# Patient Record
Sex: MALE | Race: WHITE | Employment: OTHER | URBAN - NONMETROPOLITAN AREA
[De-identification: names, ages, dates, MRNs, and addresses within clinical notes are randomized per-mention and may not be internally consistent; named-entity substitution may affect disease eponyms.]

---

## 2020-06-22 NOTE — PROGRESS NOTES
as needed for Migraine      scopolamine (TRANSDERM-SCOP) patch   Place onto the skin every 72 hours as needed       aspirin 81 MG tablet Take 81 mg by mouth daily.  esomeprazole (NEXIUM) 40 MG capsule Take 40 mg by mouth every morning (before breakfast).  Multiple Vitamin (MULTI-VITAMIN PO) Take  by mouth daily. No current facility-administered medications for this visit. No Known Allergies    Subjective:      Review of Systems   Constitutional: Negative. Negative for activity change, appetite change, chills, diaphoresis, fatigue, fever and unexpected weight change. HENT: Negative. Negative for congestion, dental problem, drooling, ear discharge, ear pain, facial swelling, hearing loss, mouth sores, nosebleeds, postnasal drip, rhinorrhea, sinus pressure, sinus pain, sneezing, sore throat, tinnitus, trouble swallowing and voice change. Eyes: Negative. Negative for photophobia, pain, discharge, redness, itching and visual disturbance. Respiratory: Negative. Negative for apnea, cough, choking, chest tightness, shortness of breath, wheezing and stridor. Cardiovascular: Negative for chest pain, palpitations and leg swelling. Gastrointestinal: Positive for abdominal pain. Endocrine: Negative. Negative for cold intolerance, heat intolerance, polydipsia, polyphagia and polyuria. Genitourinary: Positive for testicular pain. Negative for decreased urine volume, difficulty urinating, discharge, dysuria, enuresis, flank pain, frequency, genital sores, hematuria, penile pain, penile swelling, scrotal swelling and urgency. Musculoskeletal: Negative for arthralgias, back pain, gait problem, joint swelling, myalgias, neck pain and neck stiffness. Skin: Negative for color change, pallor, rash and wound. Allergic/Immunologic: Negative. Negative for environmental allergies, food allergies and immunocompromised state. Neurological: Positive for headaches.  Negative for dizziness, tremors, seizures, syncope, facial asymmetry, speech difficulty, weakness, light-headedness and numbness. Hematological: Negative. Negative for adenopathy. Does not bruise/bleed easily. Psychiatric/Behavioral: Negative. Negative for agitation, behavioral problems, confusion, decreased concentration, dysphoric mood, hallucinations, self-injury, sleep disturbance and suicidal ideas. The patient is not nervous/anxious and is not hyperactive. Objective: There were no vitals taken for this visit. Physical Exam  Constitutional:       Appearance: He is well-developed. HENT:      Head: Normocephalic and atraumatic. Eyes:      General: No scleral icterus. Left eye: No discharge. Conjunctiva/sclera: Conjunctivae normal.      Pupils: Pupils are equal, round, and reactive to light. Neck:      Thyroid: No thyromegaly. Trachea: No tracheal deviation. Cardiovascular:      Rate and Rhythm: Normal rate and regular rhythm. Heart sounds: Normal heart sounds. No murmur. No friction rub. No gallop. Pulmonary:      Effort: Pulmonary effort is normal. No respiratory distress. Breath sounds: Normal breath sounds. No wheezing or rales. Chest:      Chest wall: No tenderness. Abdominal:      Palpations: There is no mass. Comments: Inguinal hernia incisions healing well   Musculoskeletal: Normal range of motion. General: No tenderness. Lymphadenopathy:      Cervical: No cervical adenopathy. Skin:     General: Skin is warm and dry. Coloration: Skin is not pale. Findings: No erythema or rash. Neurological:      Mental Status: He is alert and oriented to person, place, and time. Psychiatric:         Behavior: Behavior normal.         Thought Content:  Thought content normal.         Judgment: Judgment normal.            Results for orders placed or performed during the hospital encounter of 10/05/11   Alpha-1-antitrypsin   Result Value Ref Range    A-1 Antitrypsin SEE BELOW        Assessment:     1. History of colonic polyps in need of screening colonoscopy discussed the sedation with the patient he has been through this before he understands he also understands the prep 2. Persistent history of reflux disease with mild dysphasia with known mild stricture we will also perform EGD with possible dilatation at that time    Plan:     1. Schedule Dino CASILLAS for colonoscopy and EGD  2. The risks, benefits and alternatives were discussed with Dino CASILLAS. He understands and wishes to proceed with surgical intervention. 3. Restrictions discussed with July Garcia and he expresses understanding. 4. He is advised to call back directly if there are further questions/concerns, or if his symptoms worsen prior to surgery.             Electronicallysigned by Sylvie Castro MD on 6/22/2020 at 7:54 PM

## 2020-06-23 ENCOUNTER — OFFICE VISIT (OUTPATIENT)
Dept: SURGERY | Age: 70
End: 2020-06-23
Payer: COMMERCIAL

## 2020-06-23 VITALS
BODY MASS INDEX: 25.12 KG/M2 | HEART RATE: 82 BPM | DIASTOLIC BLOOD PRESSURE: 60 MMHG | RESPIRATION RATE: 18 BRPM | OXYGEN SATURATION: 96 % | SYSTOLIC BLOOD PRESSURE: 120 MMHG | WEIGHT: 185.5 LBS | HEIGHT: 72 IN | TEMPERATURE: 96.5 F

## 2020-06-23 PROCEDURE — 99213 OFFICE O/P EST LOW 20 MIN: CPT | Performed by: SURGERY

## 2020-06-24 ENCOUNTER — PREP FOR PROCEDURE (OUTPATIENT)
Dept: SURGERY | Age: 70
End: 2020-06-24

## 2020-06-24 ENCOUNTER — TELEPHONE (OUTPATIENT)
Dept: SURGERY | Age: 70
End: 2020-06-24

## 2020-06-24 RX ORDER — SODIUM CHLORIDE 450 MG/100ML
INJECTION, SOLUTION INTRAVENOUS CONTINUOUS
Status: CANCELLED | OUTPATIENT
Start: 2020-06-24

## 2020-07-21 PROCEDURE — 43450 DILATE ESOPHAGUS 1/MULT PASS: CPT | Performed by: SURGERY

## 2020-07-23 ENCOUNTER — HOSPITAL ENCOUNTER (OUTPATIENT)
Age: 70
Discharge: HOME | End: 2020-07-23
Payer: MEDICARE

## 2020-07-23 PROCEDURE — U0003 INFECTIOUS AGENT DETECTION BY NUCLEIC ACID (DNA OR RNA); SEVERE ACUTE RESPIRATORY SYNDROME CORONAVIRUS 2 (SARS-COV-2) (CORONAVIRUS DISEASE [COVID-19]), AMPLIFIED PROBE TECHNIQUE, MAKING USE OF HIGH THROUGHPUT TECHNOLOGIES AS DESCRIBED BY CMS-2020-01-R: HCPCS

## 2020-07-24 LAB — SARS-COV-2: NOT DETECTED

## 2020-07-27 ASSESSMENT — ENCOUNTER SYMPTOMS
TROUBLE SWALLOWING: 0
CHOKING: 0
EYE PAIN: 0
VOICE CHANGE: 0
WHEEZING: 0
RHINORRHEA: 0
PHOTOPHOBIA: 0
EYE ITCHING: 0
EYES NEGATIVE: 1
FACIAL SWELLING: 0
RESPIRATORY NEGATIVE: 1
SORE THROAT: 0
COUGH: 0
CHEST TIGHTNESS: 0
EYE REDNESS: 0
BACK PAIN: 0
APNEA: 0
SINUS PRESSURE: 0
SHORTNESS OF BREATH: 0
ABDOMINAL PAIN: 1
STRIDOR: 0
ALLERGIC/IMMUNOLOGIC NEGATIVE: 1
EYE DISCHARGE: 0
COLOR CHANGE: 0
SINUS PAIN: 0

## 2020-07-27 NOTE — H&P
[address] [facility]  [address] [facility] [address]  Dept: [phone]  Dept Fax: [phone]  Loc: [phone]    Visit Date: 6/23/2020    Ronnie Cooper is a 78 y.o. male who presentstoday for:  Chief Complaint   Patient presents with    Surgical Consult     est pt- last seen- 2015- due for colonoscopy/ EGD- last one- 8/27/2015       HPI:     HPI 78-year-old white male I performed a colonoscopy in 2015 and at that time had a history of known polyps although no polyps were found 5 years ago he did have scattered diverticular disease also has a long history of acid reflux and we did an EGD at that time also with dilatation to [address] for a mild stricture patient is here for colonoscopy follow-up due to his history of polyps he is still having issues with reflux and we will plan an EGD also  He has no change in bowel habits he has no family history of colon cancer it should be noted the patient has had bilateral inguinal hernia repairs at another facility in January of this year  Past Medical History:   Diagnosis Date    Fracture of nose 1961      Past Surgical History:   Procedure Laterality Date   Sriram Devante  2010    Dr. Sandro Orona  2010    Dr. Navdeep Jesus        Family History   Problem Relation Age of Onset    Stroke Father     Cancer Mother         female    Stroke Mother     Diabetes Maternal Grandmother     Heart Disease Neg Hx     High Blood Pressure Neg Hx         Social History     Tobacco Use    Smoking status: Never Smoker    Smokeless tobacco: Never Used   Substance Use Topics    Alcohol use: Yes     Comment: occasional          Current Outpatient Medications   Medication Sig Dispense Refill    pravastatin (PRAVACHOL) 20 MG tablet Take 20 mg by mouth daily      SUMAtriptan (IMITREX) 100 MG tablet Take 100 mg by mouth once as needed for Migraine      scopolamine (TRANSDERM-SCOP) patch   Place onto the skin every 72 hours as needed       aspirin 81 MG tablet Take 81 mg by mouth daily.  esomeprazole (NEXIUM) 40 MG capsule Take 40 mg by mouth every morning (before breakfast).  Multiple Vitamin (MULTI-VITAMIN PO) Take  by mouth daily. No current facility-administered medications for this visit. No Known Allergies    Subjective:      Review of Systems   Constitutional: Negative. Negative for activity change, appetite change, chills, diaphoresis, fatigue, fever and unexpected weight change. HENT: Negative. Negative for congestion, dental problem, drooling, ear discharge, ear pain, facial swelling, hearing loss, mouth sores, nosebleeds, postnasal drip, rhinorrhea, sinus pressure, sinus pain, sneezing, sore throat, tinnitus, trouble swallowing and voice change. Eyes: Negative. Negative for photophobia, pain, discharge, redness, itching and visual disturbance. Respiratory: Negative. Negative for apnea, cough, choking, chest tightness, shortness of breath, wheezing and stridor. Cardiovascular: Negative for chest pain, palpitations and leg swelling. Gastrointestinal: Positive for abdominal pain. Endocrine: Negative. Negative for cold intolerance, heat intolerance, polydipsia, polyphagia and polyuria. Genitourinary: Positive for testicular pain. Negative for decreased urine volume, difficulty urinating, discharge, dysuria, enuresis, flank pain, frequency, genital sores, hematuria, penile pain, penile swelling, scrotal swelling and urgency. Musculoskeletal: Negative for arthralgias, back pain, gait problem, joint swelling, myalgias, neck pain and neck stiffness. Skin: Negative for color change, pallor, rash and wound. Allergic/Immunologic: Negative. Negative for environmental allergies, food allergies and immunocompromised state. Neurological: Positive for headaches.  Negative for dizziness,

## 2020-07-27 NOTE — H&P
[address] [address]  History and Physical Update      Pt Name: Reina Fitzpatrick  MRN: [id number]  YOB: 1950  Date of evaluation: 7/27/2020    [x] I have examined the patient and reviewed the H&P/Consult and there are no changes to the patient or plans. [] I have examined the patient and reviewed the H&P/Consult and have noted the following changes:         Janeth Bowles  Electronically signed 7/27/2020 at 7:50 PM

## 2020-07-28 ENCOUNTER — ANESTHESIA EVENT (OUTPATIENT)
Dept: ENDOSCOPY | Age: 70
End: 2020-07-28
Payer: MEDICARE

## 2020-07-28 ENCOUNTER — HOSPITAL ENCOUNTER (OUTPATIENT)
Age: 70
Setting detail: OUTPATIENT SURGERY
Discharge: HOME | End: 2020-07-28
Attending: SURGERY | Admitting: SURGERY
Payer: MEDICARE

## 2020-07-28 ENCOUNTER — ANESTHESIA (OUTPATIENT)
Dept: ENDOSCOPY | Age: 70
End: 2020-07-28
Payer: MEDICARE

## 2020-07-28 VITALS
TEMPERATURE: 96.9 F | OXYGEN SATURATION: 97 % | WEIGHT: 180.8 LBS | RESPIRATION RATE: 16 BRPM | HEART RATE: 59 BPM | DIASTOLIC BLOOD PRESSURE: 71 MMHG | BODY MASS INDEX: 24.49 KG/M2 | SYSTOLIC BLOOD PRESSURE: 107 MMHG | HEIGHT: 72 IN

## 2020-07-28 VITALS
OXYGEN SATURATION: 98 % | SYSTOLIC BLOOD PRESSURE: 85 MMHG | DIASTOLIC BLOOD PRESSURE: 54 MMHG | RESPIRATION RATE: 17 BRPM

## 2020-07-28 PROCEDURE — 3700000000 HC ANESTHESIA ATTENDED CARE: Performed by: SURGERY

## 2020-07-28 PROCEDURE — 7100000010 HC PHASE II RECOVERY - FIRST 15 MIN: Performed by: SURGERY

## 2020-07-28 PROCEDURE — 43239 EGD BIOPSY SINGLE/MULTIPLE: CPT | Performed by: SURGERY

## 2020-07-28 PROCEDURE — 2580000003 HC RX 258

## 2020-07-28 PROCEDURE — 7100000011 HC PHASE II RECOVERY - ADDTL 15 MIN: Performed by: SURGERY

## 2020-07-28 PROCEDURE — 88305 TISSUE EXAM BY PATHOLOGIST: CPT

## 2020-07-28 PROCEDURE — 45378 DIAGNOSTIC COLONOSCOPY: CPT | Performed by: SURGERY

## 2020-07-28 PROCEDURE — 3700000001 HC ADD 15 MINUTES (ANESTHESIA): Performed by: SURGERY

## 2020-07-28 PROCEDURE — 87081 CULTURE SCREEN ONLY: CPT

## 2020-07-28 PROCEDURE — 3609027000 HC COLONOSCOPY: Performed by: SURGERY

## 2020-07-28 PROCEDURE — 2720000010 HC SURG SUPPLY STERILE: Performed by: SURGERY

## 2020-07-28 PROCEDURE — 2709999900 HC NON-CHARGEABLE SUPPLY: Performed by: SURGERY

## 2020-07-28 PROCEDURE — 2500000003 HC RX 250 WO HCPCS: Performed by: NURSE ANESTHETIST, CERTIFIED REGISTERED

## 2020-07-28 PROCEDURE — 6360000002 HC RX W HCPCS: Performed by: NURSE ANESTHETIST, CERTIFIED REGISTERED

## 2020-07-28 PROCEDURE — 3609012700 HC EGD DILATION SAVORY: Performed by: SURGERY

## 2020-07-28 PROCEDURE — 3609012400 HC EGD TRANSORAL BIOPSY SINGLE/MULTIPLE: Performed by: SURGERY

## 2020-07-28 RX ORDER — LIDOCAINE HYDROCHLORIDE 10 MG/ML
INJECTION, SOLUTION INFILTRATION; PERINEURAL PRN
Status: DISCONTINUED | OUTPATIENT
Start: 2020-07-28 | End: 2020-07-28 | Stop reason: SDUPTHER

## 2020-07-28 RX ORDER — SODIUM CHLORIDE 450 MG/100ML
INJECTION, SOLUTION INTRAVENOUS CONTINUOUS
Status: DISCONTINUED | OUTPATIENT
Start: 2020-07-28 | End: 2020-07-28 | Stop reason: HOSPADM

## 2020-07-28 RX ORDER — PROPOFOL 10 MG/ML
INJECTION, EMULSION INTRAVENOUS PRN
Status: DISCONTINUED | OUTPATIENT
Start: 2020-07-28 | End: 2020-07-28 | Stop reason: SDUPTHER

## 2020-07-28 RX ADMIN — PROPOFOL 50 MG: 10 INJECTION, EMULSION INTRAVENOUS at 08:17

## 2020-07-28 RX ADMIN — SODIUM CHLORIDE: 4.5 INJECTION, SOLUTION INTRAVENOUS at 08:10

## 2020-07-28 RX ADMIN — LIDOCAINE HYDROCHLORIDE 50 MG: 10 INJECTION, SOLUTION INFILTRATION; PERINEURAL at 08:11

## 2020-07-28 RX ADMIN — PROPOFOL 50 MG: 10 INJECTION, EMULSION INTRAVENOUS at 08:34

## 2020-07-28 RX ADMIN — PROPOFOL 50 MG: 10 INJECTION, EMULSION INTRAVENOUS at 08:11

## 2020-07-28 RX ADMIN — PROPOFOL 50 MG: 10 INJECTION, EMULSION INTRAVENOUS at 08:27

## 2020-07-28 RX ADMIN — PROPOFOL 50 MG: 10 INJECTION, EMULSION INTRAVENOUS at 08:47

## 2020-07-28 ASSESSMENT — PAIN SCALES - GENERAL
PAINLEVEL_OUTOF10: 0
PAINLEVEL_OUTOF10: 0

## 2020-07-28 ASSESSMENT — PAIN - FUNCTIONAL ASSESSMENT: PAIN_FUNCTIONAL_ASSESSMENT: 0-10

## 2020-07-28 NOTE — PROGRESS NOTES
EGD and colonoscopy complete, photos taken, two specimens taken by cold forceps during egd and none during colonoscopy, pt dilated with size 48, 50, 52, 54 good dilators, pt tolerated procedure well    Scope Number gif [id number] and cf 606 used.

## 2020-07-28 NOTE — BRIEF OP NOTE
Brief Postoperative Note      Patient: Ronnie Cooper  YOB: 1950  MRN: [id number]    Date of Procedure: 7/28/2020    Pre-Op Diagnosis: ENCOUNTER FOR SCREEING/GERD    Post-Op Diagnosis: 1. DIVERTICULOSIS  2.ESOPHAGITIS  3. MILD STRICTURE       Procedure(s):  COLONOSCOPY  EGD BIOPSY  EGD DILATION SAVORY 54 FR    Surgeon(s):  Adriana Shaw MD    Assistant:      Anesthesia: Monitor Anesthesia Care    Estimated Blood Loss (mL): 3 ML    Complications: NONE    Specimens:   ID Type Source Tests Collected by Time Destination   1 : BARBRA TEST  Tissue Stomach BARBRA TEST, H. PYLORI DETECTION Adriana Shaw MD 7/28/2020 7232    A : BX EG JUNCTION R/O BARRETTS  Tissue Esophagus SURGICAL PATHOLOGY Adriana Shaw MD 7/28/2020 0848        Implants:        Drains    Findings: SEE OP NOTE    Electronically signed by Adriana Shaw MD on 7/28/2020 at 8:58 AM

## 2020-07-28 NOTE — ANESTHESIA PRE PROCEDURE
Department of Anesthesiology  Preprocedure Note       Name:  Amarilis Hamilton   Age:  69 y.o.  :  1950                                          MRN:  [id number]         Date:  2020      Surgeon: Melo Berrios):  Becky Lopez MD    Procedure: Procedure(s):  COLORECTAL CANCER SCREENING, NOT HIGH RISK  EGD ESOPHAGOGASTRODUODENOSCOPY    Medications prior to admission:   Prior to Admission medications    Medication Sig Start Date End Date Taking? Authorizing Provider   SUMAtriptan (IMITREX) 100 MG tablet Take 100 mg by mouth once as needed for Migraine    Historical Provider, MD   scopolamine (TRANSDERM-SCOP) patch   Place onto the skin every 72 hours as needed     Historical Provider, MD       Current medications:    Current Facility-Administered Medications   Medication Dose Route Frequency Provider Last Rate Last Dose    0.45 % sodium chloride infusion   Intravenous Continuous Danielle Chacon LPN           Allergies:  No Known Allergies    Problem List:  There is no problem list on file for this patient.       Past Medical History:        Diagnosis Date    Cancer ([address]     shoulders, nose, skin [place]    Esophageal stricture     Esophagitis     Fracture of nose     Migraines     Motion sickness        Past Surgical History:        Procedure Laterality Date   Gadiel Diop      Dr. Zahida Ledesma  2015    Dr Mart Reddy  2020    Bilateral inguinal hernia repair[place] [place] Dr Joshua Scanlon      Dr. Lalo Saini  2015    Dr Branden Cook       Social History:    Social History     Tobacco Use    Smoking status: Never Smoker    Smokeless tobacco: Never Used   Substance Use Topics    Alcohol use: Yes     Comment: occasional                                Counseling given: Not Answered      Vital Signs (Current):   Vitals:    07/28/20 0728   BP: 111/81   Pulse: 70   Resp: 18   Temp: 35.9 °C (96.7 °F)   TempSrc: Temporal   SpO2: 98%   Weight: 180 lb 12.8 oz (82 kg)   Height: 6' (1.829 m)                                              BP Readings from Last 3 Encounters:   07/28/20 111/81   06/23/20 120/60   09/10/15 110/70       NPO Status:                                                                                 BMI:   Wt Readings from Last 3 Encounters:   07/28/20 180 lb 12.8 oz (82 kg)   06/23/20 185 lb 8 oz (84.1 kg)   09/10/15 197 lb (89.4 kg)     Body mass index is 24.52 kg/m². CBC: No results found for: WBC, RBC, HGB, HCT, MCV, RDW, PLT    CMP: No results found for: NA, K, CL, CO2, BUN, CREATININE, GFRAA, AGRATIO, LABGLOM, GLUCOSE, PROT, CALCIUM, BILITOT, ALKPHOS, AST, ALT    POC Tests: No results for input(s): POCGLU, POCNA, POCK, POCCL, POCBUN, POCHEMO, POCHCT in the last 72 hours. Coags: No results found for: PROTIME, INR, APTT    HCG (If Applicable): No results found for: PREGTESTUR, PREGSERUM, HCG, HCGQUANT     ABGs: No results found for: PHART, PO2ART, RIP0VZK, LWB4NLY, BEART, A6YSEXFW     Type & Screen (If Applicable):  No results found for: LABABO, LABRH    Drug/Infectious Status (If Applicable):  No results found for: HIV, HEPCAB    COVID-19 Screening (If Applicable):   Lab Results   Component Value Date    COVID19 Not Detected 07/23/2020         Anesthesia Evaluation  Patient summary reviewed  Airway: Mallampati: II  TM distance: >3 FB   Neck ROM: full  Mouth opening: > = 3 FB Dental: normal exam         Pulmonary:Negative Pulmonary ROS and normal exam                               Cardiovascular:Negative CV ROS  Exercise tolerance: good (>4 METS),           Rhythm: regular  Rate: normal                    Neuro/Psych:   (+) headaches: migraine headaches,             GI/Hepatic/Renal:            ROS comment: H/O colon polyps, diverticulosis.    Endo/Other: Negative Endo/Other ROS             Pt had no PAT visit       Abdominal:       Abdomen: soft. Vascular: negative vascular ROS. Anesthesia Plan      MAC     ASA 2       Induction: intravenous. Anesthetic plan and risks discussed with patient and spouse. Plan discussed with CRNA.                   GORDON Quiles - CRNA   7/28/2020

## 2020-07-28 NOTE — ANESTHESIA POSTPROCEDURE EVALUATION
Department of Anesthesiology  Postprocedure Note    Patient: Rick Hernandez  MRN: [id number]  YOB: 1950  Date of evaluation: 7/28/2020  Time:  8:56 AM     Procedure Summary     Date:  07/28/20 Room / Location:  [address] [place] [address] / [address] [place] [address]    Anesthesia Start:  8787 Anesthesia Stop:  9261    Procedures:       COLORECTAL CANCER SCREENING, NOT HIGH RISK (Left Anus)      EGD BIOPSY (Left Esophagus) Diagnosis:  (ENCOUNTER FOR SCREEING)    Surgeon: Tommy Oh MD Responsible Provider:  Mellisa Joyner MD    Anesthesia Type:  MAC ASA Status:  2          Anesthesia Type: MAC    Landon Phase I: Landon Score: 10    Landon Phase II:      Last vitals: Reviewed and per EMR flowsheets.        Anesthesia Post Evaluation    Patient location during evaluation: bedside  Patient participation: complete - patient participated  Level of consciousness: awake and alert  Pain score: 0  Airway patency: patent  Nausea & Vomiting: no vomiting and no nausea  Complications: no  Cardiovascular status: hemodynamically stable  Respiratory status: acceptable  Hydration status: stable

## 2020-07-29 LAB — UREASE-CLO-C. PYLORI: NEGATIVE

## 2020-07-29 NOTE — OP NOTE
[address] [place] [address]                                OPERATIVE REPORT    PATIENT NAME: Phoebe Leyden                      :        1950  MED REC NO:   [id number]                           ROOM:  ACCOUNT NO:   [de-identified]                           ADMIT DATE: 2020  PROVIDER:     Bere Ruiz. MD Wilbur    DATE OF PROCEDURE:  2020    PREOPERATIVE DIAGNOSES:  1. History of polyps. 2.  History of reflux disease. 3.  History of mild esophageal stricture. POSTOPERATIVE DIAGNOSES:  1. Diverticulosis of the colon, otherwise no evidence of polyps. 2.  Active chronic esophagitis with mild stricture. 3.  Small hiatal hernia. OPERATION PERFORMED:  1. Colonoscopy. 2.  EGD with biopsies of the EG junction and biopsies for BARBRA. 3.  Dilatation of the EG junction with a #54-Wallisian. SURGEON:  Bere Ruiz. Phyllis Neri MD    ANESTHESIA:  Per nurse anesthesia with Diprivan. COMPLICATIONS:  None. Blood Loss 1 mL  INDICATIONS FOR PROCEDURE:  The patient is a 26-year-old white male who  has a history of colon polyps and also history of reflux disease. His  last colonoscopy and EGD was 5 years ago. He is here for repeat scopes. FINDINGS:  On colonoscopy, the prep was good. The patient had no  evidence of polyps. He did have sigmoid diverticular disease. On upper  scope, the patient had EG junction at 40 cm with a very small hiatal  hernia. There was some active chronic esophagitis; otherwise the  gastric body and the rest of the esophagus was normal.    OPERATIVE PROCEDURE:  The patient was brought into the endoscopy suite,  placed in the left lateral decubitus position.   After adequate nurse  anesthesia with Diprivan was administered, the Olympus endoscope was  inserted in the anus and easily passed up through the rectum through the  sigmoid colon where there was some tortuosity, but the scope was passed  on up through the sigmoid where we encountered diverticular disease. The scope was passed on up through the descending colon, across the  transverse colon. It was at this point we did require some abdominal  wall compression to get the scope into the cecum; however, this was  accomplished and then the scope was passed down into the cecum and the  picture was taken. The scope was then withdrawn. The cecum, the  ascending colon, the transverse colon was normal.  It should be noted  that the scope was withdrawn, it would be reinserted to view the  preceding area. We continued in this manner of withdrawal reinsertion  all the way back through the sigmoid where again we encountered  diverticular disease. The scope was withdrawn back through the rectum,  I did not appreciate any significant hemorrhoidal tissue and then the  scope was withdrawn. At this point in time, the table was flipped  around. The Olympus endoscope was inserted in the back of throat and  easily passed down through the esophagus to the EG junction at 40 cm. The scope was passed into the body of the stomach through a patent  antrum into the duodenum. The scope was then withdrawn. Biopsies of  the EG to the antrum was taken for BARBRA. Scope was withdrawn back to the  EG junction where there was clear-cut chronic esophagitis with some  active inflammation. The scope was withdrawn through length of the  esophagus that was normal.  The [facility] dilators were then taken. The  patient was dilated from 48 up to a #54-[place]ian, for which he tolerated  well. JEIMY WILLARD [facility], MD    D: 07/28/2020 21:10:14       T: 07/28/2020 21:16:07     [email or web address]  Job#: [id number]     Doc#: [id number]    CC:

## 2020-07-30 ENCOUNTER — TELEPHONE (OUTPATIENT)
Dept: SURGERY | Age: 70
End: 2020-07-30

## 2024-08-02 ENCOUNTER — LAB (OUTPATIENT)
Dept: LAB | Age: 74
End: 2024-08-02

## 2024-08-02 LAB
C-REACTIVE PROTEIN, HIGH SENSITIVITY: 3 MG/L (ref 0–3)
DEPRECATED MEAN GLUCOSE BLD GHB EST-ACNC: 102 MG/DL (ref 70–126)
ERYTHROCYTE [SEDIMENTATION RATE] IN BLOOD BY WESTERGREN METHOD: 4 MM/HR (ref 0–10)
FOLATE SERPL-MCNC: > 20 NG/ML (ref 4.8–24.2)
GLUCOSE FASTING: 98 MG/DL (ref 70–108)
HBA1C MFR BLD HPLC: 5.4 % (ref 4.4–6.4)
INSULIN SERPL-ACNC: 4.3 MU/L
RHEUMATOID FACT SERPL-ACNC: < 10 IU/ML (ref 0–13)
URATE SERPL-MCNC: 5.1 MG/DL (ref 3.7–7)
VIT B12 SERPL-MCNC: 928 PG/ML (ref 211–911)

## 2024-08-04 LAB
CYCLIC CITRULLINATED PEPTIDE ANTIBODY IGG: 0.9 U/ML (ref 0–7)
HLA-B27 QL FC: NEGATIVE

## 2024-08-05 LAB
CARDIOLIPIN IGG SER IA-ACNC: < 10 GPL
CARDIOLIPIN IGM SER IA-ACNC: < 10 MPL
NUCLEAR IGG SER QL IA: NORMAL

## 2024-08-08 LAB — VIT B1 PYROPHOSHATE BLD-SCNC: 135 NMOL/L (ref 70–180)

## (undated) DEVICE — SET ADMIN 25ML L117IN PMP MOD CK VLV RLER CLMP 2 SMRTSITE

## (undated) DEVICE — FORCEP RAD JAW W/NEEDLE 160CM

## (undated) DEVICE — SOLUTION IV 1000ML 0.45% SOD CHL PH 5 INJ USP VIAFLX PLAS

## (undated) DEVICE — 4-PORT MANIFOLD: Brand: NEPTUNE 2

## (undated) DEVICE — CONMED SCOPE SAVER BITE BLOCK, 20X27 MM: Brand: SCOPE SAVER

## (undated) DEVICE — KIT INF CTRL 2OZ LUB TBNG L12FT DBL END BRSH SYR OP4

## (undated) DEVICE — TUBING IV STOPCOCK 48 CM 3 W

## (undated) DEVICE — CATHETER ETER IV 22GA L1IN POLYUR STR RADPQ INTROCAN SFTY

## (undated) DEVICE — BIOGUARD A/W CLEANING ADAPTER

## (undated) DEVICE — SET LNR RED GRN W/ BASE CLEANASCOPE

## (undated) DEVICE — GLOVE ORANGE PI 7 1/2   MSG9075

## (undated) DEVICE — IV START KIT: Brand: MEDLINE INDUSTRIES, INC.

## (undated) DEVICE — SOLUTION IV IRRIG WATER 1000ML POUR BRL 2F7114